# Patient Record
Sex: MALE | Race: WHITE | NOT HISPANIC OR LATINO | URBAN - METROPOLITAN AREA
[De-identification: names, ages, dates, MRNs, and addresses within clinical notes are randomized per-mention and may not be internally consistent; named-entity substitution may affect disease eponyms.]

---

## 2017-08-03 ENCOUNTER — EMERGENCY (EMERGENCY)
Facility: HOSPITAL | Age: 47
LOS: 1 days | Discharge: HOME | End: 2017-08-03
Attending: EMERGENCY MEDICINE | Admitting: EMERGENCY MEDICINE
Payer: MEDICAID

## 2017-08-03 VITALS
TEMPERATURE: 98 F | SYSTOLIC BLOOD PRESSURE: 126 MMHG | OXYGEN SATURATION: 98 % | DIASTOLIC BLOOD PRESSURE: 76 MMHG | HEART RATE: 75 BPM | RESPIRATION RATE: 17 BRPM

## 2017-08-03 VITALS
DIASTOLIC BLOOD PRESSURE: 83 MMHG | SYSTOLIC BLOOD PRESSURE: 130 MMHG | OXYGEN SATURATION: 97 % | RESPIRATION RATE: 14 BRPM | HEART RATE: 77 BPM | TEMPERATURE: 98 F

## 2017-08-03 DIAGNOSIS — N34.2 OTHER URETHRITIS: ICD-10-CM

## 2017-08-03 DIAGNOSIS — Z90.89 ACQUIRED ABSENCE OF OTHER ORGANS: Chronic | ICD-10-CM

## 2017-08-03 DIAGNOSIS — N48.89 OTHER SPECIFIED DISORDERS OF PENIS: ICD-10-CM

## 2017-08-03 LAB
APPEARANCE UR: CLEAR — SIGNIFICANT CHANGE UP
BILIRUB UR-MCNC: NEGATIVE — SIGNIFICANT CHANGE UP
COLOR SPEC: YELLOW — SIGNIFICANT CHANGE UP
DIFF PNL FLD: NEGATIVE — SIGNIFICANT CHANGE UP
GLUCOSE UR QL: NEGATIVE — SIGNIFICANT CHANGE UP
KETONES UR-MCNC: NEGATIVE — SIGNIFICANT CHANGE UP
LEUKOCYTE ESTERASE UR-ACNC: NEGATIVE — SIGNIFICANT CHANGE UP
NITRITE UR-MCNC: NEGATIVE — SIGNIFICANT CHANGE UP
PH UR: 6 — SIGNIFICANT CHANGE UP (ref 5–8)
PROT UR-MCNC: NEGATIVE MG/DL — SIGNIFICANT CHANGE UP
SP GR SPEC: >=1.03 — SIGNIFICANT CHANGE UP (ref 1–1.03)
UROBILINOGEN FLD QL: 0.2 E.U./DL — SIGNIFICANT CHANGE UP

## 2017-08-03 PROCEDURE — 99283 EMERGENCY DEPT VISIT LOW MDM: CPT

## 2017-08-03 RX ORDER — SACCHAROMYCES BOULARDII 250 MG
1 POWDER IN PACKET (EA) ORAL
Qty: 60 | Refills: 6 | OUTPATIENT
Start: 2017-08-03 | End: 2018-02-28

## 2017-08-03 RX ORDER — FLUCONAZOLE 150 MG/1
1 TABLET ORAL
Qty: 4 | Refills: 0 | OUTPATIENT
Start: 2017-08-03 | End: 2017-08-31

## 2017-08-03 NOTE — ED PROVIDER NOTE - OBJECTIVE STATEMENT
47 yo M with Hx of ureter yeast infections presents c/o pain and discomfort to penis. Pt states has Kip Lele piercing which has been prone to infection, both on the skin in the area and yeast infections in the urethra, and has recently been experiencing "tingling" sensation over the past few days. Pt was seen by a clinic, which conducted UA and tested negative for gonorrhea, but had elevated white blood cells and was given Azithromycin. Pt notes has been unable to engage in intercourse, can only produce minimal discharge, and tingling sensation came back after finishing Abx, so came to ER. Denies fever, chills, N/V or any other complaints.

## 2017-08-03 NOTE — ED ADULT TRIAGE NOTE - CHIEF COMPLAINT QUOTE
Pt with possible infection to an old  piercing on his penis. Pt was seen and treat with ABX but no improvement.

## 2017-08-03 NOTE — ED PROVIDER NOTE - MEDICAL DECISION MAKING DETAILS
Will conduct labs, including UA, Herpes testing, and fungal and Chlamydia swab and reassess. Urethral tip imitation. Will conduct labs, including UA, Herpes testing, and fungal and Chlamydia swab and reassess. Given Rx for probiotics and Diflucan./ Patient declined abx at this time as he prefers to wait for results. Steve gave hm bad diarrhea.

## 2017-08-04 LAB
C TRACH RRNA SPEC QL NAA+PROBE: SIGNIFICANT CHANGE UP
HSV+VZV DNA SPEC QL NAA+PROBE: SIGNIFICANT CHANGE UP
N GONORRHOEA RRNA SPEC QL NAA+PROBE: SIGNIFICANT CHANGE UP
SPECIMEN SOURCE: SIGNIFICANT CHANGE UP
SPECIMEN SOURCE: SIGNIFICANT CHANGE UP

## 2017-09-02 LAB
CULTURE RESULTS: SIGNIFICANT CHANGE UP
SPECIMEN SOURCE: SIGNIFICANT CHANGE UP

## 2017-10-25 ENCOUNTER — EMERGENCY (EMERGENCY)
Facility: HOSPITAL | Age: 47
LOS: 1 days | Discharge: HOME | End: 2017-10-25
Attending: EMERGENCY MEDICINE | Admitting: EMERGENCY MEDICINE
Payer: MEDICAID

## 2017-10-25 VITALS
SYSTOLIC BLOOD PRESSURE: 115 MMHG | WEIGHT: 203.05 LBS | DIASTOLIC BLOOD PRESSURE: 77 MMHG | HEART RATE: 80 BPM | TEMPERATURE: 98 F | RESPIRATION RATE: 20 BRPM | OXYGEN SATURATION: 99 %

## 2017-10-25 DIAGNOSIS — Z77.120 CONTACT WITH AND (SUSPECTED) EXPOSURE TO MOLD (TOXIC): ICD-10-CM

## 2017-10-25 DIAGNOSIS — Z90.89 ACQUIRED ABSENCE OF OTHER ORGANS: Chronic | ICD-10-CM

## 2017-10-25 DIAGNOSIS — Z79.899 OTHER LONG TERM (CURRENT) DRUG THERAPY: ICD-10-CM

## 2017-10-25 DIAGNOSIS — R07.89 OTHER CHEST PAIN: ICD-10-CM

## 2017-10-25 PROCEDURE — 99284 EMERGENCY DEPT VISIT MOD MDM: CPT | Mod: 25

## 2017-10-25 PROCEDURE — 71020: CPT | Mod: 26

## 2017-10-25 NOTE — ED PROVIDER NOTE - MEDICAL DECISION MAKING DETAILS
Offered CXR.  Concerned about brief exposure to mold.  No cough, sob, wheezing.  Denies allergic symptoms.

## 2017-10-25 NOTE — ED ADULT TRIAGE NOTE - CHIEF COMPLAINT QUOTE
" I inhaled some mold " Pt states he feels heavy in his lungs. Pt denies chest pain/dizziness/shortness of breath

## 2017-10-25 NOTE — ED PROVIDER NOTE - DIAGNOSTIC INTERPRETATION
ER Physician: Alirio Braun  CHEST XRAY INTERPRETATION: lungs clear, heart shadow normal, bony structures intact

## 2017-10-25 NOTE — ED PROVIDER NOTE - PROGRESS NOTE DETAILS
CXR clear.  Asymptomatic.  Conservative management discussed with the patient in detail.  Close PMD follow up encouraged.  Strict ED return instructions discussed in detail and patient given the opportunity to ask any questions about their discharge diagnosis and instructions

## 2017-10-25 NOTE — ED PROVIDER NOTE - OBJECTIVE STATEMENT
Thinks he may have been exposed to mold yesterday while in a friends home.  Exposure was brief, < 3 seconds, mild.  Complains of "chest heaviness"  Denies wheezing, SOB.  + marijuana smoke use.  Denies underlying asthma, copd, allergies.  Concerned about mold exposure.

## 2018-01-09 ENCOUNTER — EMERGENCY (EMERGENCY)
Facility: HOSPITAL | Age: 48
LOS: 1 days | Discharge: HOME | End: 2018-01-09
Admitting: EMERGENCY MEDICINE
Payer: COMMERCIAL

## 2018-01-09 VITALS
RESPIRATION RATE: 17 BRPM | SYSTOLIC BLOOD PRESSURE: 125 MMHG | DIASTOLIC BLOOD PRESSURE: 78 MMHG | OXYGEN SATURATION: 98 % | HEART RATE: 74 BPM | TEMPERATURE: 98 F | WEIGHT: 207.9 LBS

## 2018-01-09 DIAGNOSIS — Z79.899 OTHER LONG TERM (CURRENT) DRUG THERAPY: ICD-10-CM

## 2018-01-09 DIAGNOSIS — Z90.89 ACQUIRED ABSENCE OF OTHER ORGANS: Chronic | ICD-10-CM

## 2018-01-09 DIAGNOSIS — M79.674 PAIN IN RIGHT TOE(S): ICD-10-CM

## 2018-01-09 DIAGNOSIS — M19.071 PRIMARY OSTEOARTHRITIS, RIGHT ANKLE AND FOOT: ICD-10-CM

## 2018-01-09 PROCEDURE — 73620 X-RAY EXAM OF FOOT: CPT | Mod: 26,RT

## 2018-01-09 PROCEDURE — 99283 EMERGENCY DEPT VISIT LOW MDM: CPT

## 2018-01-09 NOTE — ED PROVIDER NOTE - MEDICAL DECISION MAKING DETAILS
Pt refusing pain meds at this time. X-rays ordered. Suspect sesamoid bone. Refer to podiatry. Pt refusing pain meds at this time. X-rays ordered. Suspect sesamoid bone. Refer to podiatry. x- ray provided. will f/u with podiatry.

## 2018-01-09 NOTE — SBIRT NOTE. - NSSBIRTSERVICES_GEN_A_ED_FT
Provided SBIRT services: Full screen positive. Brief Intervention Performed. Screening results were reviewed with the patient and patient was provided information about healthy guidelines and potential negative consequences associated with level of risk. Motivation and readiness to reduce or stop use was discussed and goals and activities to make changes were suggested/offered.  Audit Score: 2  DAST Score: 1  Duration = # 10 Minutes

## 2018-01-09 NOTE — ED ADULT NURSE NOTE - OBJECTIVE STATEMENT
Patient reports sudden "bone sticking out on my foot" and points to bunion to R foot. Denies pain. Has been using heavy boots x1 mos. No break in skin, pulss intact. bunion noted no other deformity seen.

## 2018-01-09 NOTE — ED PROVIDER NOTE - OBJECTIVE STATEMENT
47y M with no PMHx presents to ED for toe pain. Pt states spouse noticed bump over pt's right great toe this morning. Pt denies recent injury or trauma to the area. Denies pain or f/c. Requesting x-ray.

## 2018-01-21 ENCOUNTER — EMERGENCY (EMERGENCY)
Facility: HOSPITAL | Age: 48
LOS: 1 days | Discharge: HOME | End: 2018-01-21
Attending: EMERGENCY MEDICINE | Admitting: EMERGENCY MEDICINE
Payer: MEDICAID

## 2018-01-21 VITALS
RESPIRATION RATE: 17 BRPM | TEMPERATURE: 98 F | DIASTOLIC BLOOD PRESSURE: 72 MMHG | OXYGEN SATURATION: 97 % | HEART RATE: 98 BPM | SYSTOLIC BLOOD PRESSURE: 108 MMHG

## 2018-01-21 DIAGNOSIS — Z90.89 ACQUIRED ABSENCE OF OTHER ORGANS: Chronic | ICD-10-CM

## 2018-01-21 PROCEDURE — 99283 EMERGENCY DEPT VISIT LOW MDM: CPT

## 2018-01-21 NOTE — ED PROVIDER NOTE - OBJECTIVE STATEMENT
46 yo male presents to ed for syphilis testing s/p being informed that a sexual partner of his tested + for syphilis. denies fever rash enarlged LN    states he feels well 48 yo male presents to ed for syphilis testing s/p being informed that a sexual partner of his tested + for syphilis. denies fever rash enlarged LN. no dysuria hematuria or penile dc.     states he feels well

## 2018-01-21 NOTE — ED ADULT NURSE NOTE - CHPI ED SYMPTOMS NEG
no abdominal distension/no fever/no dysuria/no diarrhea/no burning urination/no hematuria/no vomiting/no nausea/no chills/no blood in stool

## 2018-01-23 LAB — T PALLIDUM AB TITR SER: NEGATIVE — SIGNIFICANT CHANGE UP

## 2018-01-25 DIAGNOSIS — Z20.2 CONTACT WITH AND (SUSPECTED) EXPOSURE TO INFECTIONS WITH A PREDOMINANTLY SEXUAL MODE OF TRANSMISSION: ICD-10-CM

## 2018-01-25 DIAGNOSIS — Z11.3 ENCOUNTER FOR SCREENING FOR INFECTIONS WITH A PREDOMINANTLY SEXUAL MODE OF TRANSMISSION: ICD-10-CM

## 2018-01-25 DIAGNOSIS — Z79.899 OTHER LONG TERM (CURRENT) DRUG THERAPY: ICD-10-CM

## 2018-11-16 ENCOUNTER — EMERGENCY (EMERGENCY)
Facility: HOSPITAL | Age: 48
LOS: 1 days | Discharge: HOME | End: 2018-11-16
Attending: EMERGENCY MEDICINE | Admitting: EMERGENCY MEDICINE
Payer: COMMERCIAL

## 2018-11-16 VITALS
TEMPERATURE: 98 F | DIASTOLIC BLOOD PRESSURE: 95 MMHG | OXYGEN SATURATION: 98 % | WEIGHT: 205.03 LBS | HEART RATE: 81 BPM | SYSTOLIC BLOOD PRESSURE: 136 MMHG | RESPIRATION RATE: 17 BRPM

## 2018-11-16 DIAGNOSIS — Z90.89 ACQUIRED ABSENCE OF OTHER ORGANS: Chronic | ICD-10-CM

## 2018-11-16 PROCEDURE — 99283 EMERGENCY DEPT VISIT LOW MDM: CPT

## 2018-11-16 RX ORDER — FLUCONAZOLE 150 MG/1
150 TABLET ORAL ONCE
Qty: 0 | Refills: 0 | Status: COMPLETED | OUTPATIENT
Start: 2018-11-16 | End: 2018-11-16

## 2018-11-16 RX ORDER — FLUCONAZOLE 150 MG/1
1 TABLET ORAL
Qty: 6 | Refills: 0 | OUTPATIENT
Start: 2018-11-16 | End: 2018-11-29

## 2018-11-16 RX ORDER — CLOTRIMAZOLE 10 MG
1 TROCHE MUCOUS MEMBRANE
Qty: 70 | Refills: 0 | OUTPATIENT
Start: 2018-11-16 | End: 2018-11-29

## 2018-11-16 RX ADMIN — FLUCONAZOLE 150 MILLIGRAM(S): 150 TABLET ORAL at 10:52

## 2018-11-16 NOTE — ED PROVIDER NOTE - OBJECTIVE STATEMENT
47 y/o M w/ PMH HIV presents w/ white plaque on tongue x2.5 weeks w/ associated dry mouth. According to patient, he was taking "two orange" pills for STI a few weeks ago and states he developed white plaque on his tongue, concern he has oral thrush, as he has a history of developing oral thrush with antibiotics, went back to the clinic and was told they could not treat it and was subsequently sent here. Denies fever or chills. States not concern for new STI exposures, was last tested 3 weeks ago and throat swab was negative for STIs. 47 y/o M w/ no PMH presents w/ sore throat x2.5 weeks w/ associated dry mouth, white plaque on tongue, and congestion. According to patient, he was taking "two orange" pills for STI a few weeks ago and states he developed white plaque on his tongue, concern he has oral thrush, as he has a history of developing oral thrush with antibiotics, went back to the clinic and was told they could not treat it and was subsequently sent here. Denies fever or chills. States not concern for new STI exposures, was last tested 3 weeks ago and throat swab was negative for STIs. 49 y/o M w/ no PMH presents w/ sore throat x2.5 weeks w/ associated dry mouth, white plaque on tongue, and congestion. According to patient, he was treated twice this month w/ "two orange" pills for presumed penile infection and states he developed white plaque on his tongue, concern he has oral thrush, as he has a history of developing oral thrush with antibiotics, went back to the clinic and was told they could not treat it and was subsequently sent here. Denies fever or chills. States not concern for new STI exposures, was last tested 3 weeks ago and throat swab was negative for STIs. 47 y/o M w/ no PMH presents w/ sore throat x2.5 weeks w/ associated dry mouth, white plaque on tongue, and congestion. According to patient, he was treated twice this month w/ "two orange" pills for presumed STI and states he developed white plaque on his tongue, concern he has oral thrush, as he has a history of developing oral thrush with antibiotics, went back to the clinic and was told they could not treat it and was subsequently sent here. Denies fever or chills. States not concern for new STI exposures, was last tested 3 weeks ago and throat swab was negative for STIs. 49 y/o M w/ no PMH presents w/ white plaque on tongue x2.5 weeks in the setting of recent antibiotic use. According to patient, he was treated twice this month w/ "two orange" pills for presumed penile infection, states he developed white plaque on his tongue, concern he has oral thrush, as he has a history of developing oral thrush with antibiotics, went back to the clinic and was told they could not treat it and was subsequently sent here. Denies fever or chills. States not concern for new STI exposures, was last tested 3 weeks ago and throat swab was negative for STIs. 47 y/o M w/ no PMH presents w/ white plaque on tongue x2.5 weeks in the setting of recent antibiotic use. According to patient, he was treated twice this month w/ "two orange" pills for presumed penile infection, states he developed white plaque on his tongue, concern he has oral thrush, as he has a history of developing oral thrush with antibiotics, went back to the clinic and was told they could not treat it and was subsequently sent here. Denies fever or chills. States not concern for new STI exposures, was last tested 3 weeks ago and throat swab was negative for STIs. Also was Tx'd Azithro 1g PO twice.

## 2018-11-16 NOTE — ED PROVIDER NOTE - CARE PROVIDERS DIRECT ADDRESSES
[email or web address]

## 2018-11-16 NOTE — ED PROVIDER NOTE - CARE PROVIDER_API CALL
Corey Andrea), Otolaryngology  [address]  [address]  [place] [address]  Phone: [phone]  Fax: [phone]    Mylene Gillespie), Otolaryngology  [address]  [address] [place] [address]  Phone: [phone]  Fax: [phone]

## 2018-11-16 NOTE — ED PROVIDER NOTE - NSFOLLOWUPINSTRUCTIONS_ED_ALL_ED_FT
Please follow a low carb diet until symptoms resolve.  Please schedule a follow up with an ENT if symptoms persist.

## 2018-11-16 NOTE — ED PROVIDER NOTE - MEDICAL DECISION MAKING DETAILS
Patient w/ thick white coating on tongue w/ scalloping along edges x2.5 weeks after taking antibiotics. Will treat for oral thrust w/ diflucan and throat lozenges for symptomatic relief and provide referrals to ENT for further outpatient work up if symptoms do no improve. Patient presenting with likely oral thrust in the setting of previous fungal infections after antibiotics. Has been tested twice recently for STI, negative, no new sex partners. Will treat with oral antifungals and have follow up with PMD and ENT as needed.

## 2018-11-16 NOTE — ED PROVIDER NOTE - ENMT, MLM
Airway patent, Nasal mucosa clear. Tongue w/ thick white coating and scalloping along edges, oropharynx clear

## 2018-11-16 NOTE — ED ADULT NURSE NOTE - NSIMPLEMENTINTERV_GEN_ALL_ED
Implemented All Universal Safety Interventions:  [place] to call system. Call bell, personal items and telephone within reach. Instruct patient to call for assistance. Room bathroom lighting operational. Non-slip footwear when patient is off stretcher. Physically safe environment: no spills, clutter or unnecessary equipment. Stretcher in lowest position, wheels locked, appropriate side rails in place.

## 2018-11-19 DIAGNOSIS — J02.9 ACUTE PHARYNGITIS, UNSPECIFIED: ICD-10-CM

## 2018-11-19 DIAGNOSIS — Z79.899 OTHER LONG TERM (CURRENT) DRUG THERAPY: ICD-10-CM

## 2018-11-19 DIAGNOSIS — B37.0 CANDIDAL STOMATITIS: ICD-10-CM

## 2021-10-07 NOTE — ED ADULT NURSE NOTE - EXTENSIONS OF SELF_ADULT
no lesions,  no deformities,  no traumatic injuries,  no significant scars are present,  chest wall non-tender,  no masses present, breathing is unlabored without accessory muscle use,normal breath sounds
None

## 2024-06-18 NOTE — ED PROVIDER NOTE - SKIN, MLM
[facility] Diabetes Education Progress Note    Reason for Visit: Post Program Diabetes Education.      Highlights of today's visit:  Continue Toujeo 90 units nightly if HS BG is over 150 mg/dl otherwise take 70 units if BG is under 150 mg/dl   Limit to 30-45 grams of carbohydrates per meal  Always have protein with each meal  Non starchy veggies are free  Limit to 10 grams of carbs for bedtime snack  Humalog:  Take BEFORE meals ONLY  No humalog if blood sugar is under 150 mg/dl  If blood sugar is over 150 mg/dl BEFORE meal then take 30 units of Humalog before breakfast, 20 units before lunch and 26 units    before evening email.  If BG is over 180 mg/dl then take 30-20-30 units before meals (adding 4 units for evening meal)   Have 3 glass of water during the day    Current Medication and Medication Update/Changes:  Insulin: Toujeo Max 90 units per day.  (Pt has been taking per sliding scale as noted above)               Humalog ONLY if BG over 150 mg/dl 30-20-26 units before meals              Oral Medication: Metformin 1000 mg BID    Assessment of Blood Sugars:   Patient is having episodes of hyperglycemia and hypoglycemia.    Pt has Mayo 3 CGM - gets supplies from [facility]  He has mayo 3 sensor - will set it up for sensor change once he uses his last Mayo 2 sensor  CGM Download: Good time in range, however taking humalog post meals    Blood sugar goals, times of blood sugar testing and logging were discussed     The patient was seen for Diabetes Self-Management Education/ Medical Nutrition Therapy in an individual setting for diagnosis of . Type 2 diabetes mellitus with hyperglycemia, with long-term current use of insulin (CMD) [E11.65, Z79.4]  The patient was seen from  1000 to 1030 and billed for 30 minutes. Relevant medical history reviewed.  The instruction was given to the patient    This visit is being performed virtually via Telephonic Visit. Consent to treat includes permission to submit charges to the  patient's insurance. It was shared that without being seen and evaluated in person, there is a risk that the information and/or assessment may be incomplete or inaccurate. This telephonic visit may be discontinued by patient or clinician, if it is felt that the telephonic connections are not adequate for his situation.   Clinical Location: [facility]  Patient's location Home and is physically present in   the [facility] at the time of this visit.     Total Time today: 30 minutes  Carryover time from previous visit: 15 minutes  Total minutes billed today: 40 minutes (20 minutes for [id number] and 20 minutes for [id number])  Carryover time for next visit: 5 minutes    Material was presented using verbal, written, demonstration and return demonstration.    Learning needs were assessed at initial visit and the patient requires education in medical nutrition therapy, blood glucose monitoring, diabetes medications, continuous glucose monitoring, and goal setting.     Labs:  Hemoglobin A1C: target value and patient current value reviewed   No results found for: \"EHRICNLQ8W\"  Hemoglobin A1C (%)   Date Value   02/22/2024 7.1 (H)       Lipid panel:  target value and patient current value reviewed   Cholesterol (mg/dL)   Date Value   02/22/2024 135     LDL (mg/dL)   Date Value   02/22/2024 46     HDL (mg/dL)   Date Value   02/22/2024 41     Triglycerides (mg/dL)   Date Value   02/22/2024 242 (H)       Microalbumin:  target value and patient current value reviewed   Microalbumin, Urine (mg/dL)   Date Value   11/24/2023 13.50       Creatinine:  target value and patient current value reviewed   Creatinine (mg/dL)   Date Value   06/04/2024 1.27 (H)       Anthropometric Measurements:  Estimated body mass index is 37.51 kg/m² as calculated from the following:    Height as of 6/4/24: 5' 9\" (1.753 m).    Weight as of 6/4/24: 115.2 kg (254 lb).   Wt Readings from Last 2 Encounters:   06/04/24 115.2 kg (254  lb)   06/04/24 115.5 kg (254 lb 9.6 oz)       Physical Activity - Incorporating Activity into Lifestyle :  Nothing purposeful  Uses a walker    Food and Nutrition Related History:  Type of food/meals: daily eating out.    Meal/Snack pattern: as noted below      Wake up: 2875-7421  Bed: 2300-MN     Working towards limiting carbs to 30-45 grams per meal (work in progress)  Will work on limiting snack at HS to 10 grams of carbs and add protein (example greek yogurt)  No carbonated beverages after 6 pm  Drink more water    Nutrition Assessment:  Carbohydrate is poorly controlled.  Irregular meal distribution and intake     Nutrition Diagnosis:  Inconsistent carbohydrate intake related to knowledge deficit of effect food choices have on blood glucose levels as evidenced by difficulty identifying foods that raise blood glucose and foods with neutral effect..    Nutrition Prescription:  Meal plan with 30-45 grams of carbohydrates per meal  Limit to 10 grams of carbohydrates for snacks  Drink 3 glasses of water per day    Nutrition Intervention:  Comprehensive Nutrition Education as shown in care plan.    Nutrition counseling based on Cognitive behavioral theory using self-monitoring and Cognitive restructuring to Focus on Nutrition prescription and Drink more water.    Nutrition Monitoring/Evaluation:  Verbalized readiness to change nutrition related behaviors - focus on limiting intake per nutrition prescription and Drink more water.    Plan to evaluate self-management as agreed upon to limit intake per nutrition prescription through self-reported adherence score of 75% .  Patient demonstrated Basic level of knowledge in above areas.    Other Patient Information:  Has Drug resistant UTI - getting IV meds through PICC line daily.  On Prednisone low dosage as well.  Takes insulin post meals    Print/Written Resources Provided:   None - pt took notes    Plan:  Chart routed to referring Provider.  Goal Setting to Promote  Health & Problem Solving for Daily Living was discussed.  See DM Care Plan for goals and topics covered.    Order:  DSMT Order Expires: 12/31/2024  MNT Order Expires:  12/31/2024  Order located in EMR.  Billing Provider Dr Duane Taylor  Referring Provider: Dr Duane Taylor  Service Provider: Maribel Oden RD, [facility]    Recommended Follow-up:  Pt to follow up with DM education in a telephone call visit.  The patient was encouraged to call back with any questions or concerns.    Assessments :  Verify assessment form is up to date: current 3/22/2024   Skin normal color for race, warm, dry and intact. No evidence of rash.

## 2025-03-05 NOTE — ED PROVIDER NOTE - CONSTITUTIONAL, MLM
Well appearing, well nourished, awake, alert, oriented to person, place, time/situation and in no apparent distress. Show Applicator Variable?: Yes Aperture Size (Optional): A Render Note In Bullet Format When Appropriate: No Post-Care Instructions: I reviewed with the patient in detail post-care instructions. Patient is to wear sunprotection, and avoid picking at any of the treated lesions. Pt may apply Vaseline to crusted or scabbing areas. Duration Of Freeze Thaw-Cycle (Seconds): 2 Detail Level: Detailed Number Of Freeze-Thaw Cycles: 1 freeze-thaw cycle Consent: The patient's consent was obtained including but not limited to risks of crusting, scabbing, blistering, scarring, darker or lighter pigmentary change, recurrence, incomplete removal and infection. normal...